# Patient Record
Sex: MALE | Race: WHITE
[De-identification: names, ages, dates, MRNs, and addresses within clinical notes are randomized per-mention and may not be internally consistent; named-entity substitution may affect disease eponyms.]

---

## 2018-07-20 ENCOUNTER — HOSPITAL ENCOUNTER (EMERGENCY)
Age: 48
Discharge: HOME | End: 2018-07-20
Payer: COMMERCIAL

## 2018-07-20 VITALS
HEART RATE: 74 BPM | SYSTOLIC BLOOD PRESSURE: 130 MMHG | OXYGEN SATURATION: 97 % | DIASTOLIC BLOOD PRESSURE: 75 MMHG | RESPIRATION RATE: 20 BRPM

## 2018-07-20 VITALS
TEMPERATURE: 98.24 F | OXYGEN SATURATION: 96 % | DIASTOLIC BLOOD PRESSURE: 86 MMHG | HEART RATE: 76 BPM | RESPIRATION RATE: 15 BRPM | SYSTOLIC BLOOD PRESSURE: 143 MMHG

## 2018-07-20 VITALS — BODY MASS INDEX: 33.1 KG/M2

## 2018-07-20 DIAGNOSIS — Z79.899: ICD-10-CM

## 2018-07-20 DIAGNOSIS — I10: ICD-10-CM

## 2018-07-20 DIAGNOSIS — G56.03: ICD-10-CM

## 2018-07-20 DIAGNOSIS — G47.33: ICD-10-CM

## 2018-07-20 DIAGNOSIS — Z87.891: ICD-10-CM

## 2018-07-20 DIAGNOSIS — R07.9: Primary | ICD-10-CM

## 2018-07-20 PROCEDURE — 71046 X-RAY EXAM CHEST 2 VIEWS: CPT

## 2018-07-20 PROCEDURE — 93005 ELECTROCARDIOGRAM TRACING: CPT

## 2018-07-20 PROCEDURE — 99284 EMERGENCY DEPT VISIT MOD MDM: CPT

## 2018-07-20 PROCEDURE — 84484 ASSAY OF TROPONIN QUANT: CPT

## 2018-11-28 ENCOUNTER — HOSPITAL ENCOUNTER (EMERGENCY)
Dept: HOSPITAL 100 - ED | Age: 48
Discharge: HOME | End: 2018-11-28
Payer: COMMERCIAL

## 2018-11-28 VITALS
OXYGEN SATURATION: 96 % | SYSTOLIC BLOOD PRESSURE: 145 MMHG | BODY MASS INDEX: 34.8 KG/M2 | DIASTOLIC BLOOD PRESSURE: 94 MMHG | TEMPERATURE: 98.78 F | RESPIRATION RATE: 18 BRPM | HEART RATE: 84 BPM

## 2018-11-28 VITALS — RESPIRATION RATE: 16 BRPM

## 2018-11-28 DIAGNOSIS — Z79.899: ICD-10-CM
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
## 2018-11-28 DIAGNOSIS — I10: ICD-10-CM
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
## 2018-11-28 DIAGNOSIS — R20.2: ICD-10-CM

## 2018-11-28 DIAGNOSIS — S39.012A: Primary | ICD-10-CM
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
## 2018-11-28 PROCEDURE — 99283 EMERGENCY DEPT VISIT LOW MDM: CPT

## 2019-05-24 ENCOUNTER — HOSPITAL ENCOUNTER (EMERGENCY)
Dept: HOSPITAL 100 - ED | Age: 49
Discharge: HOME | End: 2019-05-24
Payer: COMMERCIAL

## 2019-05-24 VITALS
TEMPERATURE: 97.1 F | HEART RATE: 85 BPM | OXYGEN SATURATION: 96 % | DIASTOLIC BLOOD PRESSURE: 91 MMHG | SYSTOLIC BLOOD PRESSURE: 162 MMHG | RESPIRATION RATE: 18 BRPM

## 2019-05-24 VITALS — RESPIRATION RATE: 18 BRPM | HEART RATE: 80 BPM | OXYGEN SATURATION: 97 %

## 2019-05-24 VITALS — BODY MASS INDEX: 34.8 KG/M2 | BODY MASS INDEX: 30.7 KG/M2
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
## 2019-05-24 DIAGNOSIS — S93.401A: Primary | ICD-10-CM

## 2019-05-24 DIAGNOSIS — S86.001A: ICD-10-CM

## 2019-05-24 DIAGNOSIS — Z79.899: ICD-10-CM
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
## 2019-05-24 PROCEDURE — 99284 EMERGENCY DEPT VISIT MOD MDM: CPT

## 2019-05-24 PROCEDURE — 73610 X-RAY EXAM OF ANKLE: CPT

## 2019-05-24 RX ADMIN — HYDROCODONE BITARTRATE AND ACETAMINOPHEN 0 TABLET: 5; 325 TABLET ORAL at 21:15

## 2019-05-24 RX ADMIN — HYDROCODONE BITARTRATE AND ACETAMINOPHEN 1 TABLET: 5; 325 TABLET ORAL at 21:14

## 2019-12-26 ENCOUNTER — HOSPITAL ENCOUNTER (OUTPATIENT)
Age: 49
End: 2019-12-26
Payer: COMMERCIAL

## 2019-12-26 VITALS — BODY MASS INDEX: 30.7 KG/M2

## 2019-12-26 DIAGNOSIS — S99.922A: ICD-10-CM

## 2019-12-26 DIAGNOSIS — S99.912A: Primary | ICD-10-CM

## 2019-12-26 PROCEDURE — 73610 X-RAY EXAM OF ANKLE: CPT

## 2019-12-26 PROCEDURE — 73630 X-RAY EXAM OF FOOT: CPT

## 2020-10-16 ENCOUNTER — HOSPITAL ENCOUNTER (OUTPATIENT)
Age: 50
End: 2020-10-16
Payer: COMMERCIAL

## 2020-10-16 VITALS — BODY MASS INDEX: 30.7 KG/M2

## 2020-10-16 DIAGNOSIS — Z20.828: Primary | ICD-10-CM

## 2020-10-16 DIAGNOSIS — J06.9: ICD-10-CM

## 2020-10-16 PROCEDURE — 87635 SARS-COV-2 COVID-19 AMP PRB: CPT

## 2020-10-16 PROCEDURE — U0003 INFECTIOUS AGENT DETECTION BY NUCLEIC ACID (DNA OR RNA); SEVERE ACUTE RESPIRATORY SYNDROME CORONAVIRUS 2 (SARS-COV-2) (CORONAVIRUS DISEASE [COVID-19]), AMPLIFIED PROBE TECHNIQUE, MAKING USE OF HIGH THROUGHPUT TECHNOLOGIES AS DESCRIBED BY CMS-2020-01-R: HCPCS

## 2020-10-16 PROCEDURE — C9803 HOPD COVID-19 SPEC COLLECT: HCPCS

## 2022-07-31 ENCOUNTER — HOSPITAL ENCOUNTER (OUTPATIENT)
Age: 52
Discharge: HOME | End: 2022-07-31
Payer: COMMERCIAL

## 2022-07-31 DIAGNOSIS — J02.9: Primary | ICD-10-CM

## 2022-07-31 PROCEDURE — 87081 CULTURE SCREEN ONLY: CPT

## 2024-07-11 ENCOUNTER — HOSPITAL ENCOUNTER (EMERGENCY)
Age: 54
LOS: 1 days | Discharge: HOME | End: 2024-07-12
Payer: COMMERCIAL

## 2024-07-11 VITALS
DIASTOLIC BLOOD PRESSURE: 87 MMHG | HEART RATE: 71 BPM | TEMPERATURE: 97 F | OXYGEN SATURATION: 98 % | SYSTOLIC BLOOD PRESSURE: 149 MMHG | RESPIRATION RATE: 18 BRPM

## 2024-07-11 VITALS — BODY MASS INDEX: 32.8 KG/M2

## 2024-07-11 DIAGNOSIS — I10: ICD-10-CM

## 2024-07-11 DIAGNOSIS — S81.812A: Primary | ICD-10-CM

## 2024-07-11 DIAGNOSIS — F17.210: ICD-10-CM
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
## 2024-07-11 DIAGNOSIS — Z79.899: ICD-10-CM
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
## 2024-07-11 PROCEDURE — 12002 RPR S/N/AX/GEN/TRNK2.6-7.5CM: CPT

## 2024-07-11 PROCEDURE — 99282 EMERGENCY DEPT VISIT SF MDM: CPT

## 2024-07-11 PROCEDURE — 73590 X-RAY EXAM OF LOWER LEG: CPT

## 2024-07-12 VITALS
OXYGEN SATURATION: 97 % | HEART RATE: 61 BPM | DIASTOLIC BLOOD PRESSURE: 93 MMHG | RESPIRATION RATE: 16 BRPM | TEMPERATURE: 98.7 F | SYSTOLIC BLOOD PRESSURE: 128 MMHG

## 2024-08-24 ENCOUNTER — APPOINTMENT (OUTPATIENT)
Dept: RADIOLOGY | Facility: HOSPITAL | Age: 54
End: 2024-08-24
Payer: COMMERCIAL

## 2024-08-24 ENCOUNTER — HOSPITAL ENCOUNTER (EMERGENCY)
Facility: HOSPITAL | Age: 54
Discharge: HOME | End: 2024-08-24
Attending: EMERGENCY MEDICINE
Payer: COMMERCIAL

## 2024-08-24 VITALS
HEIGHT: 76 IN | WEIGHT: 265 LBS | SYSTOLIC BLOOD PRESSURE: 142 MMHG | HEART RATE: 68 BPM | RESPIRATION RATE: 20 BRPM | TEMPERATURE: 97.2 F | DIASTOLIC BLOOD PRESSURE: 67 MMHG | OXYGEN SATURATION: 98 % | BODY MASS INDEX: 32.27 KG/M2

## 2024-08-24 DIAGNOSIS — S49.91XA SHOULDER INJURY, RIGHT, INITIAL ENCOUNTER: Primary | ICD-10-CM

## 2024-08-24 PROCEDURE — 2500000004 HC RX 250 GENERAL PHARMACY W/ HCPCS (ALT 636 FOR OP/ED): Performed by: EMERGENCY MEDICINE

## 2024-08-24 PROCEDURE — 73030 X-RAY EXAM OF SHOULDER: CPT | Mod: RT

## 2024-08-24 PROCEDURE — 99283 EMERGENCY DEPT VISIT LOW MDM: CPT

## 2024-08-24 PROCEDURE — 96372 THER/PROPH/DIAG INJ SC/IM: CPT | Performed by: EMERGENCY MEDICINE

## 2024-08-24 PROCEDURE — 73030 X-RAY EXAM OF SHOULDER: CPT | Mod: RIGHT SIDE | Performed by: RADIOLOGY

## 2024-08-24 RX ORDER — KETOROLAC TROMETHAMINE 30 MG/ML
15 INJECTION, SOLUTION INTRAMUSCULAR; INTRAVENOUS ONCE
Status: COMPLETED | OUTPATIENT
Start: 2024-08-24 | End: 2024-08-24

## 2024-08-24 RX ORDER — ETODOLAC 400 MG/1
400 TABLET, FILM COATED ORAL 2 TIMES DAILY
Qty: 14 TABLET | Refills: 0 | Status: SHIPPED | OUTPATIENT
Start: 2024-08-24 | End: 2024-08-31

## 2024-08-24 RX ADMIN — KETOROLAC TROMETHAMINE 15 MG: 30 INJECTION, SOLUTION INTRAMUSCULAR at 10:25

## 2024-08-24 ASSESSMENT — COLUMBIA-SUICIDE SEVERITY RATING SCALE - C-SSRS
2. HAVE YOU ACTUALLY HAD ANY THOUGHTS OF KILLING YOURSELF?: NO
6. HAVE YOU EVER DONE ANYTHING, STARTED TO DO ANYTHING, OR PREPARED TO DO ANYTHING TO END YOUR LIFE?: NO
1. IN THE PAST MONTH, HAVE YOU WISHED YOU WERE DEAD OR WISHED YOU COULD GO TO SLEEP AND NOT WAKE UP?: NO

## 2024-08-24 ASSESSMENT — PAIN SCALES - GENERAL
PAINLEVEL_OUTOF10: 10 - WORST POSSIBLE PAIN
PAINLEVEL_OUTOF10: 10 - WORST POSSIBLE PAIN

## 2024-08-24 ASSESSMENT — PAIN - FUNCTIONAL ASSESSMENT: PAIN_FUNCTIONAL_ASSESSMENT: 0-10

## 2024-08-24 ASSESSMENT — PAIN DESCRIPTION - LOCATION
LOCATION: SHOULDER
LOCATION: SHOULDER

## 2024-08-24 ASSESSMENT — PAIN DESCRIPTION - ORIENTATION
ORIENTATION: RIGHT
ORIENTATION: RIGHT

## 2024-08-24 NOTE — ED PROVIDER NOTES
"HPI   Chief Complaint   Patient presents with    Shoulder Injury     Right shoulder pain starting yesterday, pt states he lifted something heavy yesterday and \"felt something tear\" + numbness in fingertips       Patient presents to the emergency department secondary to right shoulder pain.  He states that this was provoked when attempting to lift a lawnmower and a box onto a wheeled cart at a hardware store.  Took Aleve to help with his pain.  Worse with range of motion.  No history of direct trauma      History provided by:  Patient   used: No            Patient History   Past Medical History:   Diagnosis Date    Hypertension      History reviewed. No pertinent surgical history.  No family history on file.  Social History     Tobacco Use    Smoking status: Never    Smokeless tobacco: Not on file   Substance Use Topics    Alcohol use: Not on file    Drug use: Never       Physical Exam   ED Triage Vitals [08/24/24 0944]   Temperature Heart Rate Respirations BP   36.2 °C (97.2 °F) 68 20 142/67      Pulse Ox Temp src Heart Rate Source Patient Position   98 % -- -- --      BP Location FiO2 (%)     -- --       Physical Exam  Vitals and nursing note reviewed.   Constitutional:       General: He is not in acute distress.     Appearance: Normal appearance. He is normal weight. He is not ill-appearing, toxic-appearing or diaphoretic.      Comments: Sitting up in bed drinking an iced coffee.  Not in any acute distress and appears well   HENT:      Head: Normocephalic and atraumatic.      Nose: Nose normal. No rhinorrhea.   Neck:      Comments: Trachea is midline  Cardiovascular:      Rate and Rhythm: Normal rate and regular rhythm.      Pulses: Normal pulses.   Pulmonary:      Breath sounds: Normal breath sounds.   Musculoskeletal:         General: Tenderness present. No swelling, deformity or signs of injury.      Cervical back: Normal range of motion.      Left lower leg: No edema.      Comments: There " is no gross abnormality to the right upper extremity.  Range of motion at the shoulder is limited secondary to pain.  There is no point tenderness over the AC joint.  He can abduct to about 45 degrees.   strengths are equal.   Skin:     General: Skin is warm and dry.      Findings: No rash.   Neurological:      General: No focal deficit present.      Mental Status: He is alert and oriented to person, place, and time. Mental status is at baseline.      Sensory: No sensory deficit.   Psychiatric:         Mood and Affect: Mood normal.         Behavior: Behavior normal.         Thought Content: Thought content normal.         Judgment: Judgment normal.           ED Course & MDM                  No data recorded     [place] Coma Scale Score: 15 (08/24/24 0946 : Dayami Rose RN)                           Medical Decision Making  It is noted that before the patient's x-ray results had resulted the patient apparently left the emergency room without telling myself or the staff.  The patient called back into the emergency room requesting the results of his x-ray and I took this phone call.  The patient felt that his wait time was too long and at the time of our phone conversation it is noted that his triage time was 1 hour and 38 minutes ago.  I explained to the patient by phone that his x-ray showed no acute bony abnormality and I will print discharge papers for him referring him to orthopedics and provide a prescription for NSAIDs.  He will be instructed to ice the affected area and he can return to the emergency room at any time if he wishes to be further evaluated.  His discharge paperwork was left at the front registration desk and he could pick this up at his convenience        Procedure  Procedures     Primo Gaona,   08/24/24 1113

## 2024-09-19 ENCOUNTER — HOSPITAL ENCOUNTER (EMERGENCY)
Age: 54
LOS: 1 days | Discharge: HOME | End: 2024-09-20
Payer: COMMERCIAL

## 2024-09-19 VITALS
SYSTOLIC BLOOD PRESSURE: 139 MMHG | HEART RATE: 77 BPM | RESPIRATION RATE: 19 BRPM | DIASTOLIC BLOOD PRESSURE: 94 MMHG | OXYGEN SATURATION: 95 %

## 2024-09-19 VITALS
SYSTOLIC BLOOD PRESSURE: 131 MMHG | DIASTOLIC BLOOD PRESSURE: 79 MMHG | OXYGEN SATURATION: 100 % | RESPIRATION RATE: 19 BRPM | HEART RATE: 71 BPM

## 2024-09-19 VITALS — OXYGEN SATURATION: 98 % | TEMPERATURE: 97.1 F | HEART RATE: 82 BPM | RESPIRATION RATE: 18 BRPM

## 2024-09-19 VITALS — BODY MASS INDEX: 32.1 KG/M2

## 2024-09-19 VITALS — OXYGEN SATURATION: 95 %

## 2024-09-19 DIAGNOSIS — S50.02XA: ICD-10-CM
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
## 2024-09-19 DIAGNOSIS — S06.9X9A: Primary | ICD-10-CM

## 2024-09-19 DIAGNOSIS — F17.210: ICD-10-CM

## 2024-09-19 DIAGNOSIS — Z79.899: ICD-10-CM

## 2024-09-19 DIAGNOSIS — S40.012A: ICD-10-CM

## 2024-09-19 DIAGNOSIS — I10: ICD-10-CM

## 2024-09-19 DIAGNOSIS — M19.90: ICD-10-CM
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
## 2024-09-19 DIAGNOSIS — R40.2412: ICD-10-CM

## 2024-09-19 PROCEDURE — 72125 CT NECK SPINE W/O DYE: CPT

## 2024-09-19 PROCEDURE — 73080 X-RAY EXAM OF ELBOW: CPT

## 2024-09-19 PROCEDURE — 99285 EMERGENCY DEPT VISIT HI MDM: CPT

## 2024-09-19 PROCEDURE — 73030 X-RAY EXAM OF SHOULDER: CPT

## 2024-09-19 PROCEDURE — 93005 ELECTROCARDIOGRAM TRACING: CPT

## 2024-09-19 PROCEDURE — 70450 CT HEAD/BRAIN W/O DYE: CPT

## 2024-09-20 VITALS
HEART RATE: 75 BPM | TEMPERATURE: 98.4 F | RESPIRATION RATE: 18 BRPM | DIASTOLIC BLOOD PRESSURE: 102 MMHG | OXYGEN SATURATION: 98 % | SYSTOLIC BLOOD PRESSURE: 149 MMHG

## 2024-09-20 VITALS — SYSTOLIC BLOOD PRESSURE: 149 MMHG | HEART RATE: 77 BPM | DIASTOLIC BLOOD PRESSURE: 102 MMHG

## 2024-12-09 ENCOUNTER — HOSPITAL ENCOUNTER (INPATIENT)
Dept: HOSPITAL 100 - ED | Age: 54
LOS: 2 days | Discharge: HOME | DRG: 378 | End: 2024-12-11
Payer: COMMERCIAL

## 2024-12-09 VITALS
HEART RATE: 88 BPM | SYSTOLIC BLOOD PRESSURE: 163 MMHG | RESPIRATION RATE: 18 BRPM | OXYGEN SATURATION: 98 % | TEMPERATURE: 98.42 F | DIASTOLIC BLOOD PRESSURE: 78 MMHG

## 2024-12-09 VITALS
DIASTOLIC BLOOD PRESSURE: 81 MMHG | HEART RATE: 78 BPM | RESPIRATION RATE: 14 BRPM | SYSTOLIC BLOOD PRESSURE: 128 MMHG | OXYGEN SATURATION: 96 % | TEMPERATURE: 98.5 F

## 2024-12-09 VITALS
HEART RATE: 86 BPM | SYSTOLIC BLOOD PRESSURE: 153 MMHG | DIASTOLIC BLOOD PRESSURE: 96 MMHG | TEMPERATURE: 98.06 F | OXYGEN SATURATION: 99 % | RESPIRATION RATE: 16 BRPM

## 2024-12-09 VITALS
SYSTOLIC BLOOD PRESSURE: 160 MMHG | RESPIRATION RATE: 16 BRPM | HEART RATE: 89 BPM | OXYGEN SATURATION: 98 % | TEMPERATURE: 98.2 F | DIASTOLIC BLOOD PRESSURE: 76 MMHG

## 2024-12-09 VITALS
DIASTOLIC BLOOD PRESSURE: 88 MMHG | SYSTOLIC BLOOD PRESSURE: 143 MMHG | OXYGEN SATURATION: 96 % | HEART RATE: 74 BPM | TEMPERATURE: 98.96 F | RESPIRATION RATE: 16 BRPM

## 2024-12-09 VITALS
HEART RATE: 87 BPM | OXYGEN SATURATION: 99 % | SYSTOLIC BLOOD PRESSURE: 150 MMHG | DIASTOLIC BLOOD PRESSURE: 92 MMHG | TEMPERATURE: 98.4 F | RESPIRATION RATE: 14 BRPM

## 2024-12-09 VITALS — BODY MASS INDEX: 32.3 KG/M2 | BODY MASS INDEX: 32.6 KG/M2

## 2024-12-09 VITALS
TEMPERATURE: 98.4 F | HEART RATE: 115 BPM | RESPIRATION RATE: 18 BRPM | OXYGEN SATURATION: 98 % | SYSTOLIC BLOOD PRESSURE: 138 MMHG | DIASTOLIC BLOOD PRESSURE: 102 MMHG

## 2024-12-09 VITALS — OXYGEN SATURATION: 96 %

## 2024-12-09 DIAGNOSIS — K21.9: ICD-10-CM

## 2024-12-09 DIAGNOSIS — E86.0: ICD-10-CM

## 2024-12-09 DIAGNOSIS — T36.95XA: ICD-10-CM

## 2024-12-09 DIAGNOSIS — I10: ICD-10-CM

## 2024-12-09 DIAGNOSIS — Z23: ICD-10-CM

## 2024-12-09 DIAGNOSIS — E66.9: ICD-10-CM

## 2024-12-09 DIAGNOSIS — F32.A: ICD-10-CM

## 2024-12-09 DIAGNOSIS — Z87.891: ICD-10-CM

## 2024-12-09 DIAGNOSIS — Z79.1: ICD-10-CM

## 2024-12-09 DIAGNOSIS — E80.7: ICD-10-CM

## 2024-12-09 DIAGNOSIS — Z79.899: ICD-10-CM

## 2024-12-09 DIAGNOSIS — R79.89: ICD-10-CM

## 2024-12-09 DIAGNOSIS — K57.31: Primary | ICD-10-CM

## 2024-12-09 DIAGNOSIS — K52.1: ICD-10-CM

## 2024-12-09 DIAGNOSIS — D75.1: ICD-10-CM

## 2024-12-09 DIAGNOSIS — F41.9: ICD-10-CM

## 2024-12-09 DIAGNOSIS — D72.829: ICD-10-CM

## 2024-12-09 LAB
ALANINE AMINOTRANSFER ALT/SGPT: 55 U/L (ref 16–61)
ALBUMIN SERPL-MCNC: 4.1 G/DL (ref 3.2–5)
ALKALINE PHOSPHATASE: 60 U/L (ref 45–117)
ANION GAP: 8 (ref 5–15)
APTT PPP: 23.7 SECONDS (ref 24.1–36.2)
AST(SGOT): 31 U/L (ref 15–37)
BUN SERPL-MCNC: 25 MG/DL (ref 7–18)
BUN/CREAT RATIO: 17.1 RATIO (ref 10–20)
CALCIUM SERPL-MCNC: 9.9 MG/DL (ref 8.5–10.1)
CARBON DIOXIDE: 26 MMOL/L (ref 21–32)
CHLORIDE: 107 MMOL/L (ref 98–107)
DEPRECATED RDW RBC: 39 FL (ref 35.1–43.9)
ERYTHROCYTE [DISTWIDTH] IN BLOOD: 13.6 % (ref 11.6–14.6)
EST GLOM FILT RATE - AFR AMER: 65 ML/MIN (ref 60–?)
ESTIMATED CREATININE CLEARANCE: 82.45 ML/MIN
GLOBULIN: 4.3 G/DL (ref 2.2–4.2)
GLUCOSE: 158 MG/DL (ref 74–106)
HCT VFR BLD AUTO: 49.9 % (ref 40–54)
HEMOGLOBIN: 16.7 G/DL (ref 13–16.5)
HGB BLD-MCNC: 16.7 G/DL (ref 13–16.5)
IMMATURE GRANULOCYTES COUNT: 0.18 X10^3/UL (ref 0–0)
MCV RBC: 80.2 FL (ref 80–94)
MEAN CORP HGB CONC: 33.5 G/DL (ref 32–36)
MEAN PLATELET VOL.: 9.8 FL (ref 6.2–12)
NRBC FLAGGED BY ANALYZER: 0 % (ref 0–5)
PLATELET # BLD: 394 K/MM3 (ref 150–450)
PLATELET COUNT: 394 K/MM3 (ref 150–450)
POTASSIUM: 4.6 MMOL/L (ref 3.5–5.1)
PROTHROMBIN TIME (PROTIME)PT.: 13.8 SECONDS (ref 11.7–14.9)
RBC # BLD AUTO: 6.22 M/MM3 (ref 4.6–6.2)
RBC DISTRIBUTION WIDTH CV: 13.6 % (ref 11.6–14.6)
RBC DISTRIBUTION WIDTH SD: 39 FL (ref 35.1–43.9)
WBC # BLD AUTO: 21.6 K/MM3 (ref 4.4–11)
WHITE BLOOD COUNT: 21.6 K/MM3 (ref 4.4–11)

## 2024-12-09 PROCEDURE — 84100 ASSAY OF PHOSPHORUS: CPT

## 2024-12-09 PROCEDURE — 83735 ASSAY OF MAGNESIUM: CPT

## 2024-12-09 PROCEDURE — A4216 STERILE WATER/SALINE, 10 ML: HCPCS

## 2024-12-09 PROCEDURE — 85730 THROMBOPLASTIN TIME PARTIAL: CPT

## 2024-12-09 PROCEDURE — 36415 COLL VENOUS BLD VENIPUNCTURE: CPT

## 2024-12-09 PROCEDURE — 99284 EMERGENCY DEPT VISIT MOD MDM: CPT

## 2024-12-09 PROCEDURE — 85025 COMPLETE CBC W/AUTO DIFF WBC: CPT

## 2024-12-09 PROCEDURE — 83605 ASSAY OF LACTIC ACID: CPT

## 2024-12-09 PROCEDURE — 80053 COMPREHEN METABOLIC PANEL: CPT

## 2024-12-09 PROCEDURE — 94668 MNPJ CHEST WALL SBSQ: CPT

## 2024-12-09 PROCEDURE — 74177 CT ABD & PELVIS W/CONTRAST: CPT

## 2024-12-09 PROCEDURE — 90656 IIV3 VACC NO PRSV 0.5 ML IM: CPT

## 2024-12-09 PROCEDURE — 85610 PROTHROMBIN TIME: CPT

## 2024-12-09 PROCEDURE — 87040 BLOOD CULTURE FOR BACTERIA: CPT

## 2024-12-09 RX ADMIN — PIPERACILLIN SODIUM AND TAZOBACTAM SODIUM 12.5 GM: 3; .375 INJECTION, POWDER, LYOPHILIZED, FOR SOLUTION INTRAVENOUS at 21:55

## 2024-12-09 RX ADMIN — SODIUM CHLORIDE 999 ML: 9 INJECTION, SOLUTION INTRAVENOUS at 08:06

## 2024-12-09 RX ADMIN — PIPERACILLIN AND TAZOBACTAM 200 GM: 4; .5 INJECTION, POWDER, FOR SOLUTION INTRAVENOUS at 09:06

## 2024-12-09 RX ADMIN — SODIUM CHLORIDE, PRESERVATIVE FREE 0 ML: 5 INJECTION INTRAVENOUS at 17:08

## 2024-12-09 RX ADMIN — SODIUM CHLORIDE, PRESERVATIVE FREE 0 ML: 5 INJECTION INTRAVENOUS at 14:06

## 2024-12-09 RX ADMIN — PIPERACILLIN SODIUM AND TAZOBACTAM SODIUM 12.5 GM: 3; .375 INJECTION, POWDER, LYOPHILIZED, FOR SOLUTION INTRAVENOUS at 14:07

## 2024-12-10 VITALS
TEMPERATURE: 98.42 F | RESPIRATION RATE: 14 BRPM | DIASTOLIC BLOOD PRESSURE: 90 MMHG | HEART RATE: 71 BPM | OXYGEN SATURATION: 96 % | SYSTOLIC BLOOD PRESSURE: 151 MMHG

## 2024-12-10 VITALS
DIASTOLIC BLOOD PRESSURE: 68 MMHG | OXYGEN SATURATION: 96 % | SYSTOLIC BLOOD PRESSURE: 116 MMHG | RESPIRATION RATE: 16 BRPM | TEMPERATURE: 98 F | HEART RATE: 70 BPM

## 2024-12-10 VITALS
TEMPERATURE: 98.1 F | HEART RATE: 62 BPM | SYSTOLIC BLOOD PRESSURE: 126 MMHG | OXYGEN SATURATION: 97 % | DIASTOLIC BLOOD PRESSURE: 81 MMHG | RESPIRATION RATE: 14 BRPM

## 2024-12-10 VITALS
DIASTOLIC BLOOD PRESSURE: 76 MMHG | RESPIRATION RATE: 16 BRPM | HEART RATE: 65 BPM | TEMPERATURE: 98.96 F | OXYGEN SATURATION: 97 % | SYSTOLIC BLOOD PRESSURE: 146 MMHG

## 2024-12-10 LAB
ALANINE AMINOTRANSFER ALT/SGPT: 45 U/L (ref 16–61)
ALBUMIN SERPL-MCNC: 2.9 G/DL (ref 3.2–5)
ALKALINE PHOSPHATASE: 41 U/L (ref 45–117)
ANION GAP: 3 (ref 5–15)
AST(SGOT): 30 U/L (ref 15–37)
BUN SERPL-MCNC: 23 MG/DL (ref 7–18)
BUN/CREAT RATIO: 18.5 RATIO (ref 10–20)
CALCIUM SERPL-MCNC: 8.3 MG/DL (ref 8.5–10.1)
CARBON DIOXIDE: 28 MMOL/L (ref 21–32)
CHLORIDE: 108 MMOL/L (ref 98–107)
DEPRECATED RDW RBC: 40.9 FL (ref 35.1–43.9)
ERYTHROCYTE [DISTWIDTH] IN BLOOD: 13.9 % (ref 11.6–14.6)
EST GLOM FILT RATE - AFR AMER: 78 ML/MIN (ref 60–?)
ESTIMATED CREATININE CLEARANCE: 96.5 ML/MIN
GLOBULIN: 3.3 G/DL (ref 2.2–4.2)
GLUCOSE: 120 MG/DL (ref 74–106)
HCT VFR BLD AUTO: 42.5 % (ref 40–54)
HEMOGLOBIN: 13.7 G/DL (ref 13–16.5)
HGB BLD-MCNC: 13.7 G/DL (ref 13–16.5)
IMMATURE GRANULOCYTES COUNT: 0.03 X10^3/UL (ref 0–0)
MAGNESIUM: 1.9 MG/DL (ref 1.6–2.6)
MCV RBC: 81.9 FL (ref 80–94)
MEAN CORP HGB CONC: 32.2 G/DL (ref 32–36)
MEAN PLATELET VOL.: 9.5 FL (ref 6.2–12)
NRBC FLAGGED BY ANALYZER: 0 % (ref 0–5)
PLATELET # BLD: 243 K/MM3 (ref 150–450)
PLATELET COUNT: 243 K/MM3 (ref 150–450)
POTASSIUM: 3.9 MMOL/L (ref 3.5–5.1)
RBC # BLD AUTO: 5.19 M/MM3 (ref 4.6–6.2)
RBC DISTRIBUTION WIDTH CV: 13.9 % (ref 11.6–14.6)
RBC DISTRIBUTION WIDTH SD: 40.9 FL (ref 35.1–43.9)
WBC # BLD AUTO: 7.2 K/MM3 (ref 4.4–11)
WHITE BLOOD COUNT: 7.2 K/MM3 (ref 4.4–11)

## 2024-12-10 RX ADMIN — PIPERACILLIN SODIUM AND TAZOBACTAM SODIUM 12.5 GM: 3; .375 INJECTION, POWDER, LYOPHILIZED, FOR SOLUTION INTRAVENOUS at 14:11

## 2024-12-10 RX ADMIN — SODIUM CHLORIDE, PRESERVATIVE FREE 0 ML: 5 INJECTION INTRAVENOUS at 10:51

## 2024-12-10 RX ADMIN — PIPERACILLIN SODIUM AND TAZOBACTAM SODIUM 12.5 GM: 3; .375 INJECTION, POWDER, LYOPHILIZED, FOR SOLUTION INTRAVENOUS at 21:51

## 2024-12-10 RX ADMIN — PIPERACILLIN SODIUM AND TAZOBACTAM SODIUM 12.5 GM: 3; .375 INJECTION, POWDER, LYOPHILIZED, FOR SOLUTION INTRAVENOUS at 05:51

## 2024-12-10 RX ADMIN — INFLUENZA VIRUS VACCINE 45 MCG: 15; 15; 15 SUSPENSION INTRAMUSCULAR at 08:46

## 2024-12-10 RX ADMIN — SODIUM CHLORIDE, PRESERVATIVE FREE 0 ML: 5 INJECTION INTRAVENOUS at 08:42

## 2024-12-11 VITALS
SYSTOLIC BLOOD PRESSURE: 133 MMHG | OXYGEN SATURATION: 98 % | HEART RATE: 61 BPM | TEMPERATURE: 98.3 F | RESPIRATION RATE: 18 BRPM | DIASTOLIC BLOOD PRESSURE: 84 MMHG

## 2024-12-11 VITALS
RESPIRATION RATE: 16 BRPM | OXYGEN SATURATION: 96 % | SYSTOLIC BLOOD PRESSURE: 129 MMHG | HEART RATE: 52 BPM | TEMPERATURE: 98.78 F | DIASTOLIC BLOOD PRESSURE: 91 MMHG

## 2024-12-11 VITALS
SYSTOLIC BLOOD PRESSURE: 127 MMHG | OXYGEN SATURATION: 95 % | DIASTOLIC BLOOD PRESSURE: 59 MMHG | TEMPERATURE: 98.24 F | RESPIRATION RATE: 16 BRPM | HEART RATE: 64 BPM

## 2024-12-11 RX ADMIN — PIPERACILLIN SODIUM AND TAZOBACTAM SODIUM 12.5 GM: 3; .375 INJECTION, POWDER, LYOPHILIZED, FOR SOLUTION INTRAVENOUS at 05:20
